# Patient Record
Sex: FEMALE | Race: WHITE | NOT HISPANIC OR LATINO
[De-identification: names, ages, dates, MRNs, and addresses within clinical notes are randomized per-mention and may not be internally consistent; named-entity substitution may affect disease eponyms.]

---

## 2021-02-17 ENCOUNTER — DASHBOARD ENCOUNTERS (OUTPATIENT)
Age: 56
End: 2021-02-17

## 2021-02-17 ENCOUNTER — OFFICE VISIT (OUTPATIENT)
Dept: URBAN - NONMETROPOLITAN AREA CLINIC 2 | Facility: CLINIC | Age: 56
End: 2021-02-17
Payer: COMMERCIAL

## 2021-02-17 DIAGNOSIS — R07.0 THROAT BURNING: ICD-10-CM

## 2021-02-17 DIAGNOSIS — R10.13 EPIGASTRIC ABDOMINAL PAIN: ICD-10-CM

## 2021-02-17 DIAGNOSIS — F45.8 GLOBUS SENSATION: ICD-10-CM

## 2021-02-17 DIAGNOSIS — R49.0 HOARSENESS: ICD-10-CM

## 2021-02-17 DIAGNOSIS — Z12.11 COLON CANCER SCREENING: ICD-10-CM

## 2021-02-17 PROCEDURE — 99204 OFFICE O/P NEW MOD 45 MIN: CPT | Performed by: INTERNAL MEDICINE

## 2021-02-17 PROCEDURE — 99244 OFF/OP CNSLTJ NEW/EST MOD 40: CPT | Performed by: INTERNAL MEDICINE

## 2021-02-17 PROCEDURE — G8482 FLU IMMUNIZE ORDER/ADMIN: HCPCS | Performed by: INTERNAL MEDICINE

## 2021-02-17 RX ORDER — ESOMEPRAZOLE MAGNESIUM 20 MG/1
1 CAPSULE CAPSULE, DELAYED RELEASE ORAL ONCE A DAY
Status: ACTIVE | COMMUNITY

## 2021-02-17 RX ORDER — ST. JOHN'S WORT 300 MG
1 CAPSULE WITH A MEAL CAPSULE ORAL ONCE A DAY
Status: ACTIVE | COMMUNITY

## 2021-02-17 RX ORDER — SODIUM, POTASSIUM,MAG SULFATES 17.5-3.13G
177 ML SOLUTION, RECONSTITUTED, ORAL ORAL ONCE
Qty: 177 MILLILITER | Refills: 0 | OUTPATIENT
Start: 2021-02-17 | End: 2021-02-18

## 2021-02-17 RX ORDER — MULTIVITAMIN
1 TABLET TABLET ORAL ONCE A DAY
Status: ACTIVE | COMMUNITY

## 2021-02-17 NOTE — HPI-TODAY'S VISIT:
Evangelina is a 55-year-old female who we have been asked to consult on by Dr. Brown for some throat burning and throat clearing.  We will send a copy of our note and recommendations to her office.  She reports a lifelong history of throat clearing.  She states she has talked to several people about it, but never had an official work-up.  She has never had an upper endoscopy.  However, here recently she started having a globus sensation while trying to clear her throat.  She also feels like there is some burning in her throat and she occasionally gets some lower chest epigastric discomfort.  No melena.  No additional complaints.  She does have some small thyroid cyst?  She is actually can be seeing the ENT for this as well as the hoarse voice on Friday.  She was recently started on Nexium 20 mg twice daily about a month ago.  She is perimenopausal and very worried about osteoporosis given PPI use.  She reports she would feel much better if she could burp, but she is unable to.  Otherwise, she is healthy and works at a [employer] office in [place].  She is also due for her screening colon.  No lower GI complaints. Sb

## 2021-03-01 ENCOUNTER — OFFICE VISIT (OUTPATIENT)
Dept: URBAN - NONMETROPOLITAN AREA CLINIC 13 | Facility: CLINIC | Age: 56
End: 2021-03-01

## 2021-03-23 ENCOUNTER — OFFICE VISIT (OUTPATIENT)
Dept: URBAN - NONMETROPOLITAN AREA SURGERY CENTER 1 | Facility: SURGERY CENTER | Age: 56
End: 2021-03-23
Payer: COMMERCIAL

## 2021-03-23 DIAGNOSIS — R12 BURNING REFLUX: ICD-10-CM

## 2021-03-23 DIAGNOSIS — Z83.71 FAMILY HISTORY OF COLON POLYPS: ICD-10-CM

## 2021-03-23 PROCEDURE — G8907 PT DOC NO EVENTS ON DISCHARG: HCPCS | Performed by: INTERNAL MEDICINE

## 2021-03-23 PROCEDURE — 43235 EGD DIAGNOSTIC BRUSH WASH: CPT | Performed by: INTERNAL MEDICINE

## 2021-03-23 PROCEDURE — G0105 COLORECTAL SCRN; HI RISK IND: HCPCS | Performed by: INTERNAL MEDICINE

## 2021-04-20 ENCOUNTER — OFFICE VISIT (OUTPATIENT)
Dept: URBAN - METROPOLITAN AREA TELEHEALTH 2 | Facility: TELEHEALTH | Age: 56
End: 2021-04-20
Payer: COMMERCIAL

## 2021-04-20 DIAGNOSIS — R07.0 THROAT BURNING: ICD-10-CM

## 2021-04-20 DIAGNOSIS — R10.13 EPIGASTRIC ABDOMINAL PAIN: ICD-10-CM

## 2021-04-20 DIAGNOSIS — F45.8 GLOBUS SENSATION: ICD-10-CM

## 2021-04-20 DIAGNOSIS — R49.0 HOARSENESS: ICD-10-CM

## 2021-04-20 PROBLEM — 267103008 GLOBUS SENSATION: Status: ACTIVE | Noted: 2021-02-17

## 2021-04-20 PROCEDURE — 99212 OFFICE O/P EST SF 10 MIN: CPT | Performed by: INTERNAL MEDICINE

## 2021-04-20 RX ORDER — MULTIVITAMIN
1 TABLET TABLET ORAL ONCE A DAY
Status: ACTIVE | COMMUNITY

## 2021-04-20 RX ORDER — ESOMEPRAZOLE MAGNESIUM 20 MG/1
1 CAPSULE CAPSULE, DELAYED RELEASE ORAL ONCE A DAY
Status: ACTIVE | COMMUNITY

## 2021-04-20 RX ORDER — ST. JOHN'S WORT 300 MG
1 CAPSULE WITH A MEAL CAPSULE ORAL ONCE A DAY
Status: ACTIVE | COMMUNITY

## 2021-04-20 NOTE — HPI-TODAY'S VISIT:
Evangelina is a 55-year-old female who returns for throat clearing and burning throat. No change with nexium 20mg so she stopped this. she did it for several weeks. she has seen the ENT and they are planning on doing allergy testing to look into her symptoms further. SB  3/21 Normal EGD and colonoscopy